# Patient Record
Sex: MALE | Race: WHITE | NOT HISPANIC OR LATINO | URBAN - METROPOLITAN AREA
[De-identification: names, ages, dates, MRNs, and addresses within clinical notes are randomized per-mention and may not be internally consistent; named-entity substitution may affect disease eponyms.]

---

## 2020-07-25 ENCOUNTER — TELEPHONE ENCOUNTER (OUTPATIENT)
Dept: URBAN - METROPOLITAN AREA CLINIC 13 | Facility: CLINIC | Age: 75
End: 2020-07-25

## 2020-07-25 RX ORDER — POLYETHYLENE GLYCOL 3350, SODIUM CHLORIDE, SODIUM BICARBONATE AND POTASSIUM CHLORIDE WITH LEMON FLAVOR 420; 11.2; 5.72; 1.48 G/4L; G/4L; G/4L; G/4L
TAKE 1/2 GALLON AT 5:00 PM DAY BEFORE PROCEDURE, TAKE SECOND 1/2 OF GALLON 6 HRS PRIOR TO PROCEDURE POWDER, FOR SOLUTION ORAL
Qty: 1 | Refills: 0 | OUTPATIENT
Start: 2018-04-30 | End: 2018-05-04

## 2020-07-26 ENCOUNTER — TELEPHONE ENCOUNTER (OUTPATIENT)
Dept: URBAN - METROPOLITAN AREA CLINIC 13 | Facility: CLINIC | Age: 75
End: 2020-07-26

## 2020-07-26 RX ORDER — OXYBUTYNIN 5 MG/1
TAKE 1 TABLET TWICE DAILY TABLET, FILM COATED, EXTENDED RELEASE ORAL
Refills: 0 | Status: ACTIVE | COMMUNITY

## 2020-07-26 RX ORDER — GLIMEPIRIDE 4 MG/1
TABLET ORAL
Qty: 90 | Refills: 0 | Status: ACTIVE | COMMUNITY
Start: 2011-01-27

## 2020-07-26 RX ORDER — CHLORHEXIDINE GLUCONATE 4 %
TAKE 1 TABLET DAILY AS DIRECTED LIQUID (ML) TOPICAL
Refills: 0 | Status: ACTIVE | COMMUNITY

## 2020-07-26 RX ORDER — UBIDECARENONE/VIT E ACET 100MG-5
2 TABLETS EVERY DAY (TOTAL OF 200 MG) CAPSULE ORAL
Refills: 0 | Status: ACTIVE | COMMUNITY

## 2020-07-26 RX ORDER — LIRAGLUTIDE 6 MG/ML
INJECT 1.8 MG SUBCUTANEOUSLY EVERY DAY INJECTION SUBCUTANEOUS
Refills: 0 | Status: ACTIVE | COMMUNITY

## 2020-07-26 RX ORDER — METOPROLOL TARTRATE 25 MG/1
TAKE 2 EVERY DAY TABLET, FILM COATED ORAL
Refills: 0 | Status: ACTIVE | COMMUNITY

## 2020-07-26 RX ORDER — GLIMEPIRIDE 4 MG/1
1/2 TWO TIMES DAILY (ALSO TAKEN WITH 1/2 OF 2 MG TABLET TO EQUAL TOTAL DOSE OF 3 MG TAKEN TWICE DAILY) TABLET ORAL
Refills: 0 | Status: ACTIVE | COMMUNITY

## 2020-07-26 RX ORDER — GLIMEPIRIDE 2 MG/1
TAKE 1/2 TABLET TWICE DAILY TABLET ORAL
Refills: 0 | Status: ACTIVE | COMMUNITY

## 2020-07-26 RX ORDER — TAMSULOSIN HYDROCHLORIDE 0.4 MG/1
1 TAKEN PER DAY CAPSULE ORAL
Refills: 0 | Status: ACTIVE | COMMUNITY

## 2020-07-26 RX ORDER — AMOXICILLIN 875 MG/1
TABLET, FILM COATED ORAL
Qty: 20 | Refills: 0 | Status: ACTIVE | COMMUNITY
Start: 2011-04-07

## 2020-07-26 RX ORDER — EMPAGLIFLOZIN 25 MG/1
TAKE 1 TABLET BY MOUTH ONCE DAILY TABLET, FILM COATED ORAL
Refills: 0 | Status: ACTIVE | COMMUNITY
Start: 2019-02-03

## 2020-07-26 RX ORDER — OMEPRAZOLE 10 MG/1
TAKE 1 CAPSULE AS NEEDED DAILY CAPSULE, DELAYED RELEASE ORAL
Refills: 0 | Status: ACTIVE | COMMUNITY
Start: 2010-08-20

## 2020-07-26 RX ORDER — ALPHA LIPOIC ACID 50 MG
TAKE 1 CAPSULE EVERY DAY CAPSULE ORAL
Refills: 0 | Status: ACTIVE | COMMUNITY

## 2024-03-26 ENCOUNTER — OV NP (OUTPATIENT)
Dept: URBAN - METROPOLITAN AREA CLINIC 113 | Facility: CLINIC | Age: 79
End: 2024-03-26
Payer: MEDICARE

## 2024-03-26 VITALS
TEMPERATURE: 97.6 F | WEIGHT: 225 LBS | SYSTOLIC BLOOD PRESSURE: 159 MMHG | HEART RATE: 60 BPM | BODY MASS INDEX: 33.33 KG/M2 | HEIGHT: 69 IN | DIASTOLIC BLOOD PRESSURE: 59 MMHG

## 2024-03-26 DIAGNOSIS — R14.0 ABDOMINAL BLOATING: ICD-10-CM

## 2024-03-26 DIAGNOSIS — K58.0 IRRITABLE BOWEL SYNDROME WITH DIARRHEA: ICD-10-CM

## 2024-03-26 DIAGNOSIS — K63.8219 SMALL INTESTINAL BACTERIAL OVERGROWTH (SIBO): ICD-10-CM

## 2024-03-26 DIAGNOSIS — R14.2 BELCHING: ICD-10-CM

## 2024-03-26 PROBLEM — 197125005: Status: ACTIVE | Noted: 2024-03-26

## 2024-03-26 PROCEDURE — 99204 OFFICE O/P NEW MOD 45 MIN: CPT | Performed by: NURSE PRACTITIONER

## 2024-03-26 RX ORDER — LIRAGLUTIDE 6 MG/ML
INJECT 1.8 MG SUBCUTANEOUSLY EVERY DAY INJECTION SUBCUTANEOUS
Refills: 0 | Status: ON HOLD | COMMUNITY

## 2024-03-26 RX ORDER — GLIMEPIRIDE 2 MG/1
TAKE 1/2 TABLET TWICE DAILY TABLET ORAL
Refills: 0 | Status: ON HOLD | COMMUNITY

## 2024-03-26 RX ORDER — EMPAGLIFLOZIN 25 MG/1
TAKE 1 TABLET BY MOUTH ONCE DAILY TABLET, FILM COATED ORAL
Refills: 0 | Status: ON HOLD | COMMUNITY
Start: 2019-02-03

## 2024-03-26 RX ORDER — GLIMEPIRIDE 4 MG/1
1/2 TWO TIMES DAILY (ALSO TAKEN WITH 1/2 OF 2 MG TABLET TO EQUAL TOTAL DOSE OF 3 MG TAKEN TWICE DAILY) TABLET ORAL
Refills: 0 | Status: ACTIVE | COMMUNITY

## 2024-03-26 RX ORDER — AMOXICILLIN 875 MG/1
TABLET, FILM COATED ORAL
Qty: 20 | Refills: 0 | Status: ON HOLD | COMMUNITY
Start: 2011-04-07

## 2024-03-26 RX ORDER — OXYBUTYNIN 5 MG/1
TAKE 1 TABLET TWICE DAILY TABLET, FILM COATED, EXTENDED RELEASE ORAL
Refills: 0 | Status: ON HOLD | COMMUNITY

## 2024-03-26 RX ORDER — ALPHA LIPOIC ACID 50 MG
TAKE 1 CAPSULE EVERY DAY CAPSULE ORAL
Refills: 0 | Status: ON HOLD | COMMUNITY

## 2024-03-26 RX ORDER — TAMSULOSIN HYDROCHLORIDE 0.4 MG/1
1 TAKEN PER DAY CAPSULE ORAL
Refills: 0 | Status: ACTIVE | COMMUNITY

## 2024-03-26 RX ORDER — METOPROLOL TARTRATE 25 MG/1
TAKE 2 EVERY DAY TABLET, FILM COATED ORAL
Refills: 0 | Status: ON HOLD | COMMUNITY

## 2024-03-26 RX ORDER — GLIMEPIRIDE 4 MG/1
TABLET ORAL
Qty: 90 | Refills: 0 | Status: ON HOLD | COMMUNITY
Start: 2011-01-27

## 2024-03-26 RX ORDER — SEMAGLUTIDE 0.68 MG/ML
AS DIRECTED INJECTION, SOLUTION SUBCUTANEOUS
Status: ACTIVE | COMMUNITY

## 2024-03-26 RX ORDER — OMEPRAZOLE 10 MG/1
TAKE 1 CAPSULE AS NEEDED DAILY CAPSULE, DELAYED RELEASE ORAL
Refills: 0 | Status: ON HOLD | COMMUNITY
Start: 2010-08-20

## 2024-03-26 RX ORDER — METOPROLOL SUCCINATE 50 MG/1
1 TABLET TABLET, EXTENDED RELEASE ORAL ONCE A DAY
Status: ACTIVE | COMMUNITY

## 2024-03-26 RX ORDER — ATORVASTATIN CALCIUM 20 MG/1
1 TABLET TABLET, FILM COATED ORAL ONCE A DAY
Status: ACTIVE | COMMUNITY

## 2024-03-26 RX ORDER — TAMSULOSIN HYDROCHLORIDE 0.4 MG/1
1 CAPSULE CAPSULE ORAL ONCE A DAY
Status: ON HOLD | COMMUNITY

## 2024-03-26 RX ORDER — RIFAXIMIN 550 MG/1
1 TABLET TABLET ORAL THREE TIMES A DAY
Qty: 42 TABLET | Refills: 0 | OUTPATIENT
Start: 2024-03-26 | End: 2024-04-09

## 2024-03-26 RX ORDER — METFORMIN HYDROCHLORIDE 1000 MG/1
1 TABLET WITH A MEAL TABLET, FILM COATED ORAL ONCE A DAY
Status: ACTIVE | COMMUNITY

## 2024-03-26 RX ORDER — FAMOTIDINE 40 MG/1
1 TABLET AT BEDTIME TABLET, FILM COATED ORAL ONCE A DAY
Status: ACTIVE | COMMUNITY

## 2024-03-26 RX ORDER — FLUCONAZOLE 150 MG/1
1 TABLET TABLET ORAL
Status: ACTIVE | COMMUNITY

## 2024-03-26 RX ORDER — CHLORHEXIDINE GLUCONATE 4 %
TAKE 1 TABLET DAILY AS DIRECTED LIQUID (ML) TOPICAL
Refills: 0 | Status: ACTIVE | COMMUNITY

## 2024-03-26 RX ORDER — UBIDECARENONE/VIT E ACET 100MG-5
2 TABLETS EVERY DAY (TOTAL OF 200 MG) CAPSULE ORAL
Refills: 0 | Status: ACTIVE | COMMUNITY

## 2024-03-26 NOTE — HPI-TODAY'S VISIT:
78-year-old male presenting for a change in bowel habit. His last colonoscopy in May 2018 was notable for a good bowel preparation, normal rectal exam, diverticulosis in the sigmoid and descending colon, otherwise normal examination.  No specimens were collected.  A repeat colonoscopy was recommended in 10 years. He presents today with complaints of postprandial belching and occasional bouts of loose stools.  He can have as many as 1-2 loose stools per day to 3-4 times per day.  There is no blood per rectum.  He has not identified any triggers.  Imodium is helpful.  He feels that the diarrhea comes out of nowhere, though it can be related to belching.  The symptoms seem to be progressing.  He was started on Ozempic in January, but has not had any worsening of symptoms.  He has been started on pantoprazole and famotidine with some improvement but not complete resolved.  He continues with gas complaints.  He has occasional heartburn but otherwise no nausea, vomiting or difficulty swallowing.  His weight is stable.  Ironically, he has had 2 courses of azithromycin for other issues with good effects on his GI symptoms.

## 2024-05-08 ENCOUNTER — DASHBOARD ENCOUNTERS (OUTPATIENT)
Age: 79
End: 2024-05-08

## 2024-05-08 ENCOUNTER — OFFICE VISIT (OUTPATIENT)
Dept: URBAN - METROPOLITAN AREA CLINIC 113 | Facility: CLINIC | Age: 79
End: 2024-05-08
Payer: MEDICARE

## 2024-05-08 VITALS
DIASTOLIC BLOOD PRESSURE: 70 MMHG | SYSTOLIC BLOOD PRESSURE: 157 MMHG | HEART RATE: 66 BPM | HEIGHT: 69 IN | TEMPERATURE: 98.6 F | BODY MASS INDEX: 32.82 KG/M2 | RESPIRATION RATE: 20 BRPM | WEIGHT: 221.6 LBS

## 2024-05-08 DIAGNOSIS — K58.0 IRRITABLE BOWEL SYNDROME WITH DIARRHEA: ICD-10-CM

## 2024-05-08 PROCEDURE — 99213 OFFICE O/P EST LOW 20 MIN: CPT | Performed by: NURSE PRACTITIONER

## 2024-05-08 RX ORDER — GLIMEPIRIDE 2 MG/1
TAKE 1/2 TABLET TWICE DAILY TABLET ORAL
Refills: 0 | Status: ON HOLD | COMMUNITY

## 2024-05-08 RX ORDER — METFORMIN HYDROCHLORIDE 1000 MG/1
1 TABLET WITH A MEAL TABLET, FILM COATED ORAL ONCE A DAY
Status: ACTIVE | COMMUNITY

## 2024-05-08 RX ORDER — EMPAGLIFLOZIN 25 MG/1
TAKE 1 TABLET BY MOUTH ONCE DAILY TABLET, FILM COATED ORAL
Refills: 0 | Status: ON HOLD | COMMUNITY
Start: 2019-02-03

## 2024-05-08 RX ORDER — UBIDECARENONE/VIT E ACET 100MG-5
2 TABLETS EVERY DAY (TOTAL OF 200 MG) CAPSULE ORAL
Refills: 0 | Status: ACTIVE | COMMUNITY

## 2024-05-08 RX ORDER — TAMSULOSIN HYDROCHLORIDE 0.4 MG/1
1 TAKEN PER DAY CAPSULE ORAL
Refills: 0 | Status: ACTIVE | COMMUNITY

## 2024-05-08 RX ORDER — ALPHA LIPOIC ACID 50 MG
TAKE 1 CAPSULE EVERY DAY CAPSULE ORAL
Refills: 0 | Status: ON HOLD | COMMUNITY

## 2024-05-08 RX ORDER — METOPROLOL SUCCINATE 50 MG/1
1 TABLET TABLET, EXTENDED RELEASE ORAL ONCE A DAY
Status: ACTIVE | COMMUNITY

## 2024-05-08 RX ORDER — ATORVASTATIN CALCIUM 20 MG/1
1 TABLET TABLET, FILM COATED ORAL ONCE A DAY
Status: ACTIVE | COMMUNITY

## 2024-05-08 RX ORDER — OMEPRAZOLE 10 MG/1
TAKE 1 CAPSULE AS NEEDED DAILY CAPSULE, DELAYED RELEASE ORAL
Refills: 0 | Status: ON HOLD | COMMUNITY
Start: 2010-08-20

## 2024-05-08 RX ORDER — METOPROLOL TARTRATE 25 MG/1
TAKE 2 EVERY DAY TABLET, FILM COATED ORAL
Refills: 0 | Status: ON HOLD | COMMUNITY

## 2024-05-08 RX ORDER — AMOXICILLIN 875 MG/1
TABLET, FILM COATED ORAL
Qty: 20 | Refills: 0 | Status: ON HOLD | COMMUNITY
Start: 2011-04-07

## 2024-05-08 RX ORDER — LIRAGLUTIDE 6 MG/ML
INJECT 1.8 MG SUBCUTANEOUSLY EVERY DAY INJECTION SUBCUTANEOUS
Refills: 0 | Status: ON HOLD | COMMUNITY

## 2024-05-08 RX ORDER — GLIMEPIRIDE 4 MG/1
1/2 TWO TIMES DAILY (ALSO TAKEN WITH 1/2 OF 2 MG TABLET TO EQUAL TOTAL DOSE OF 3 MG TAKEN TWICE DAILY) TABLET ORAL
Refills: 0 | Status: ACTIVE | COMMUNITY

## 2024-05-08 RX ORDER — FAMOTIDINE 40 MG/1
1 TABLET AT BEDTIME TABLET, FILM COATED ORAL ONCE A DAY
Status: ACTIVE | COMMUNITY

## 2024-05-08 RX ORDER — FLUCONAZOLE 150 MG/1
1 TABLET TABLET ORAL
Status: ACTIVE | COMMUNITY

## 2024-05-08 RX ORDER — SEMAGLUTIDE 0.68 MG/ML
AS DIRECTED INJECTION, SOLUTION SUBCUTANEOUS
Status: ACTIVE | COMMUNITY

## 2024-05-08 RX ORDER — GLIMEPIRIDE 4 MG/1
TABLET ORAL
Qty: 90 | Refills: 0 | Status: ON HOLD | COMMUNITY
Start: 2011-01-27

## 2024-05-08 RX ORDER — OXYBUTYNIN 5 MG/1
TAKE 1 TABLET TWICE DAILY TABLET, FILM COATED, EXTENDED RELEASE ORAL
Refills: 0 | Status: ON HOLD | COMMUNITY

## 2024-05-08 RX ORDER — TAMSULOSIN HYDROCHLORIDE 0.4 MG/1
1 CAPSULE CAPSULE ORAL ONCE A DAY
Status: ON HOLD | COMMUNITY

## 2024-05-08 RX ORDER — CHLORHEXIDINE GLUCONATE 4 %
TAKE 1 TABLET DAILY AS DIRECTED LIQUID (ML) TOPICAL
Refills: 0 | Status: ACTIVE | COMMUNITY

## 2024-06-10 ENCOUNTER — TELEPHONE ENCOUNTER (OUTPATIENT)
Dept: URBAN - METROPOLITAN AREA CLINIC 113 | Facility: CLINIC | Age: 79
End: 2024-06-10

## 2024-06-10 RX ORDER — RIFAXIMIN 550 MG/1
1 TABLET TABLET ORAL THREE TIMES A DAY
Qty: 42 TABLET | Refills: 0 | OUTPATIENT
Start: 2024-06-10 | End: 2024-06-24

## 2024-06-11 ENCOUNTER — TELEPHONE ENCOUNTER (OUTPATIENT)
Dept: URBAN - METROPOLITAN AREA CLINIC 113 | Facility: CLINIC | Age: 79
End: 2024-06-11

## 2024-06-12 ENCOUNTER — TELEPHONE ENCOUNTER (OUTPATIENT)
Dept: URBAN - METROPOLITAN AREA CLINIC 113 | Facility: CLINIC | Age: 79
End: 2024-06-12

## 2024-11-19 ENCOUNTER — OFFICE VISIT (OUTPATIENT)
Dept: URBAN - METROPOLITAN AREA CLINIC 113 | Facility: CLINIC | Age: 79
End: 2024-11-19
Payer: MEDICARE

## 2024-11-19 VITALS
SYSTOLIC BLOOD PRESSURE: 169 MMHG | RESPIRATION RATE: 14 BRPM | DIASTOLIC BLOOD PRESSURE: 81 MMHG | WEIGHT: 223.4 LBS | HEIGHT: 69 IN | HEART RATE: 75 BPM | BODY MASS INDEX: 33.09 KG/M2 | TEMPERATURE: 98.1 F

## 2024-11-19 DIAGNOSIS — K58.0 IRRITABLE BOWEL SYNDROME WITH DIARRHEA: ICD-10-CM

## 2024-11-19 DIAGNOSIS — R14.0 ABDOMINAL BLOATING: ICD-10-CM

## 2024-11-19 DIAGNOSIS — R14.2 BELCHING: ICD-10-CM

## 2024-11-19 PROCEDURE — 99213 OFFICE O/P EST LOW 20 MIN: CPT | Performed by: NURSE PRACTITIONER

## 2024-11-19 RX ORDER — VIBEGRON 75 MG/1
1 TABLET TABLET, FILM COATED ORAL ONCE A DAY
Status: ACTIVE | COMMUNITY

## 2024-11-19 RX ORDER — ALPHA LIPOIC ACID 50 MG
TAKE 1 CAPSULE EVERY DAY CAPSULE ORAL
Refills: 0 | Status: ON HOLD | COMMUNITY

## 2024-11-19 RX ORDER — CHLORHEXIDINE GLUCONATE 4 %
TAKE 1 TABLET DAILY AS DIRECTED LIQUID (ML) TOPICAL
Refills: 0 | Status: ACTIVE | COMMUNITY

## 2024-11-19 RX ORDER — LIRAGLUTIDE 6 MG/ML
INJECT 1.8 MG SUBCUTANEOUSLY EVERY DAY INJECTION SUBCUTANEOUS
Refills: 0 | Status: ON HOLD | COMMUNITY

## 2024-11-19 RX ORDER — UBIDECARENONE/VIT E ACET 100MG-5
2 TABLETS EVERY DAY (TOTAL OF 200 MG) CAPSULE ORAL
Refills: 0 | Status: ACTIVE | COMMUNITY

## 2024-11-19 RX ORDER — FAMOTIDINE 40 MG/1
1 TABLET AT BEDTIME TABLET, FILM COATED ORAL ONCE A DAY
Status: ACTIVE | COMMUNITY

## 2024-11-19 RX ORDER — METOPROLOL SUCCINATE 50 MG/1
1 TABLET TABLET, EXTENDED RELEASE ORAL ONCE A DAY
Status: ACTIVE | COMMUNITY

## 2024-11-19 RX ORDER — GLIMEPIRIDE 4 MG/1
TABLET ORAL
Qty: 90 | Refills: 0 | Status: ON HOLD | COMMUNITY
Start: 2011-01-27

## 2024-11-19 RX ORDER — PANTOPRAZOLE SODIUM 40 MG/1
1 TABLET 1/2 TO 1 HOUR BEFORE MORNING MEAL TABLET, DELAYED RELEASE ORAL ONCE A DAY
Status: ACTIVE | COMMUNITY

## 2024-11-19 RX ORDER — TAMSULOSIN HYDROCHLORIDE 0.4 MG/1
1 CAPSULE CAPSULE ORAL ONCE A DAY
Status: ON HOLD | COMMUNITY

## 2024-11-19 RX ORDER — OMEPRAZOLE 10 MG/1
TAKE 1 CAPSULE AS NEEDED DAILY CAPSULE, DELAYED RELEASE ORAL
Refills: 0 | Status: ON HOLD | COMMUNITY
Start: 2010-08-20

## 2024-11-19 RX ORDER — GLIMEPIRIDE 4 MG/1
1/2 TWO TIMES DAILY (ALSO TAKEN WITH 1/2 OF 2 MG TABLET TO EQUAL TOTAL DOSE OF 3 MG TAKEN TWICE DAILY) TABLET ORAL
Refills: 0 | Status: ACTIVE | COMMUNITY

## 2024-11-19 RX ORDER — TAMSULOSIN HYDROCHLORIDE 0.4 MG/1
1 TAKEN PER DAY CAPSULE ORAL
Refills: 0 | Status: ACTIVE | COMMUNITY

## 2024-11-19 RX ORDER — VARDENAFIL HYDROCHLORIDE 20 MG/1
1 TABLET 60 MINUTES BEFORE SEXUAL ACTIVITY AS NEEDED TABLET, FILM COATED ORAL ONCE A DAY
Status: ACTIVE | COMMUNITY

## 2024-11-19 RX ORDER — FLUCONAZOLE 150 MG/1
1 TABLET TABLET ORAL
Status: ACTIVE | COMMUNITY

## 2024-11-19 RX ORDER — METOPROLOL TARTRATE 25 MG/1
TAKE 2 EVERY DAY TABLET, FILM COATED ORAL
Refills: 0 | Status: ON HOLD | COMMUNITY

## 2024-11-19 RX ORDER — ATORVASTATIN CALCIUM 20 MG/1
1 TABLET TABLET, FILM COATED ORAL ONCE A DAY
Status: ON HOLD | COMMUNITY

## 2024-11-19 RX ORDER — SEMAGLUTIDE 0.68 MG/ML
AS DIRECTED INJECTION, SOLUTION SUBCUTANEOUS
Status: ACTIVE | COMMUNITY

## 2024-11-19 RX ORDER — AMOXICILLIN 875 MG/1
TABLET, FILM COATED ORAL
Qty: 20 | Refills: 0 | Status: ON HOLD | COMMUNITY
Start: 2011-04-07

## 2024-11-19 RX ORDER — ROSUVASTATIN CALCIUM 40 MG/1
1 TABLET TABLET, COATED ORAL ONCE A DAY
Status: ACTIVE | COMMUNITY

## 2024-11-19 RX ORDER — METFORMIN HYDROCHLORIDE 1000 MG/1
1 TABLET WITH A MEAL TABLET, FILM COATED ORAL ONCE A DAY
Status: ACTIVE | COMMUNITY

## 2024-11-19 RX ORDER — EMPAGLIFLOZIN 25 MG/1
TAKE 1 TABLET BY MOUTH ONCE DAILY TABLET, FILM COATED ORAL
Refills: 0 | Status: ACTIVE | COMMUNITY
Start: 2019-02-03

## 2024-11-19 RX ORDER — OXYBUTYNIN 5 MG/1
TAKE 1 TABLET TWICE DAILY TABLET, FILM COATED, EXTENDED RELEASE ORAL
Refills: 0 | Status: ON HOLD | COMMUNITY

## 2024-11-19 RX ORDER — VALSARTAN 160 MG/1
1 TABLET TABLET, FILM COATED ORAL ONCE A DAY
Status: ACTIVE | COMMUNITY

## 2024-11-19 RX ORDER — GLIMEPIRIDE 2 MG/1
TAKE 1/2 TABLET TWICE DAILY TABLET ORAL
Refills: 0 | Status: ON HOLD | COMMUNITY

## 2024-11-19 RX ORDER — RIFAXIMIN 550 MG/1
1 TABLET TABLET ORAL TWICE A DAY
Status: ACTIVE | COMMUNITY

## 2024-11-19 NOTE — HPI-TODAY'S VISIT:
79-year-old gentleman with a history of diarrhea predominant bowel habits, gas and belching likely on the basis of a functional bowel disorder, improved with a course of Xifaxan, presenting for follow-up.  He seems to be doing okay. He repeated the course of Xifaxan, and has been doing well. He has alternating bowel habits, though has been having regular bowel movements for the last few weeks. Abdominal pain, gas and bloatingare resolved. He tells me that he was in [place] recently, suffered a fall. He was hospitalized for about 8 hours before being released. Concussion protocol and testing for spinal meningitis were negative. His PCP thinks he had COVID. He is much better now. There is no trouble with swallowing, heartburn, abdominal pain, nausea or vomiting.

## 2025-05-20 ENCOUNTER — OFFICE VISIT (OUTPATIENT)
Dept: URBAN - METROPOLITAN AREA CLINIC 113 | Facility: CLINIC | Age: 80
End: 2025-05-20

## 2025-05-21 ENCOUNTER — OFFICE VISIT (OUTPATIENT)
Dept: URBAN - METROPOLITAN AREA CLINIC 113 | Facility: CLINIC | Age: 80
End: 2025-05-21
Payer: MEDICARE

## 2025-05-21 DIAGNOSIS — R14.0 ABDOMINAL BLOATING: ICD-10-CM

## 2025-05-21 DIAGNOSIS — K58.0 IRRITABLE BOWEL SYNDROME WITH DIARRHEA: ICD-10-CM

## 2025-05-21 DIAGNOSIS — K63.8219 SMALL INTESTINAL BACTERIAL OVERGROWTH (SIBO): ICD-10-CM

## 2025-05-21 DIAGNOSIS — R14.2 BELCHING: ICD-10-CM

## 2025-05-21 PROCEDURE — 99214 OFFICE O/P EST MOD 30 MIN: CPT | Performed by: NURSE PRACTITIONER

## 2025-05-21 RX ORDER — AMOXICILLIN 875 MG/1
TABLET, FILM COATED ORAL
Qty: 20 | Refills: 0 | Status: ON HOLD | COMMUNITY
Start: 2011-04-07

## 2025-05-21 RX ORDER — SEMAGLUTIDE 0.68 MG/ML
AS DIRECTED INJECTION, SOLUTION SUBCUTANEOUS
Status: ACTIVE | COMMUNITY

## 2025-05-21 RX ORDER — GLIMEPIRIDE 2 MG/1
TAKE 1/2 TABLET TWICE DAILY TABLET ORAL
Refills: 0 | Status: ON HOLD | COMMUNITY

## 2025-05-21 RX ORDER — METOPROLOL SUCCINATE 50 MG/1
1 TABLET TABLET, EXTENDED RELEASE ORAL ONCE A DAY
Status: ACTIVE | COMMUNITY

## 2025-05-21 RX ORDER — METOPROLOL TARTRATE 25 MG/1
TAKE 2 EVERY DAY TABLET, FILM COATED ORAL
Refills: 0 | Status: ON HOLD | COMMUNITY

## 2025-05-21 RX ORDER — RIFAXIMIN 550 MG/1
1 TABLET TABLET ORAL TWICE A DAY
Status: ON HOLD | COMMUNITY

## 2025-05-21 RX ORDER — VALSARTAN 160 MG/1
1 TABLET TABLET, FILM COATED ORAL ONCE A DAY
Status: ACTIVE | COMMUNITY

## 2025-05-21 RX ORDER — ATORVASTATIN CALCIUM 20 MG/1
1 TABLET TABLET, FILM COATED ORAL ONCE A DAY
Status: ON HOLD | COMMUNITY

## 2025-05-21 RX ORDER — ROSUVASTATIN CALCIUM 40 MG/1
1 TABLET TABLET, COATED ORAL ONCE A DAY
Status: ACTIVE | COMMUNITY

## 2025-05-21 RX ORDER — UBIDECARENONE/VIT E ACET 100MG-5
2 TABLETS EVERY DAY (TOTAL OF 200 MG) CAPSULE ORAL
Refills: 0 | Status: ACTIVE | COMMUNITY

## 2025-05-21 RX ORDER — ALPHA LIPOIC ACID 50 MG
TAKE 1 CAPSULE EVERY DAY CAPSULE ORAL
Refills: 0 | Status: ON HOLD | COMMUNITY

## 2025-05-21 RX ORDER — LIRAGLUTIDE 6 MG/ML
INJECT 1.8 MG SUBCUTANEOUSLY EVERY DAY INJECTION SUBCUTANEOUS
Refills: 0 | Status: ON HOLD | COMMUNITY

## 2025-05-21 RX ORDER — METFORMIN HYDROCHLORIDE 1000 MG/1
1 TABLET WITH A MEAL TABLET, FILM COATED ORAL ONCE A DAY
Status: ACTIVE | COMMUNITY

## 2025-05-21 RX ORDER — GLIMEPIRIDE 4 MG/1
1/2 TWO TIMES DAILY (ALSO TAKEN WITH 1/2 OF 2 MG TABLET TO EQUAL TOTAL DOSE OF 3 MG TAKEN TWICE DAILY) TABLET ORAL
Refills: 0 | Status: ACTIVE | COMMUNITY

## 2025-05-21 RX ORDER — RIFAXIMIN 550 MG/1
1 TABLET TABLET ORAL THREE TIMES A DAY
Qty: 42 TABLET | Refills: 0 | OUTPATIENT
Start: 2025-05-21 | End: 2025-06-04

## 2025-05-21 RX ORDER — FLUCONAZOLE 150 MG/1
1 TABLET TABLET ORAL
Status: ACTIVE | COMMUNITY

## 2025-05-21 RX ORDER — FAMOTIDINE 40 MG/1
1 TABLET AT BEDTIME TABLET, FILM COATED ORAL ONCE A DAY
Status: ACTIVE | COMMUNITY

## 2025-05-21 RX ORDER — OXYBUTYNIN 5 MG/1
TAKE 1 TABLET TWICE DAILY TABLET, FILM COATED, EXTENDED RELEASE ORAL
Refills: 0 | Status: ON HOLD | COMMUNITY

## 2025-05-21 RX ORDER — CHLORHEXIDINE GLUCONATE 4 %
TAKE 1 TABLET DAILY AS DIRECTED LIQUID (ML) TOPICAL
Refills: 0 | Status: ACTIVE | COMMUNITY

## 2025-05-21 RX ORDER — VIBEGRON 75 MG/1
1 TABLET TABLET, FILM COATED ORAL ONCE A DAY
Status: ACTIVE | COMMUNITY

## 2025-05-21 RX ORDER — PANTOPRAZOLE SODIUM 40 MG/1
1 TABLET 1/2 TO 1 HOUR BEFORE MORNING MEAL TABLET, DELAYED RELEASE ORAL ONCE A DAY
Status: ACTIVE | COMMUNITY

## 2025-05-21 RX ORDER — TAMSULOSIN HYDROCHLORIDE 0.4 MG/1
1 CAPSULE CAPSULE ORAL ONCE A DAY
Status: ON HOLD | COMMUNITY

## 2025-05-21 RX ORDER — EMPAGLIFLOZIN 25 MG/1
TAKE 1 TABLET BY MOUTH ONCE DAILY TABLET, FILM COATED ORAL
Refills: 0 | Status: ACTIVE | COMMUNITY
Start: 2019-02-03

## 2025-05-21 RX ORDER — GLIMEPIRIDE 4 MG/1
TABLET ORAL
Qty: 90 | Refills: 0 | Status: ON HOLD | COMMUNITY
Start: 2011-01-27

## 2025-05-21 RX ORDER — TAMSULOSIN HYDROCHLORIDE 0.4 MG/1
1 TAKEN PER DAY CAPSULE ORAL
Refills: 0 | Status: ACTIVE | COMMUNITY

## 2025-05-21 RX ORDER — VARDENAFIL HYDROCHLORIDE 20 MG/1
1 TABLET 60 MINUTES BEFORE SEXUAL ACTIVITY AS NEEDED TABLET, FILM COATED ORAL ONCE A DAY
Status: ACTIVE | COMMUNITY

## 2025-05-21 RX ORDER — OMEPRAZOLE 10 MG/1
TAKE 1 CAPSULE AS NEEDED DAILY CAPSULE, DELAYED RELEASE ORAL
Refills: 0 | Status: ON HOLD | COMMUNITY
Start: 2010-08-20

## 2025-05-21 NOTE — HPI-TODAY'S VISIT:
78 yo male with a history of IBS with diarrhea and bloaitng persening for follow up. He previously improved with a course of Xifaxan.  He is doing well. There is no abdominal pain currently. He has intermittent episodes of loose stools, responsive to Lomotil as needed. No blood per rectum. He does have some bloating at times. He would like to try a repeat course of Xifaxan, stating he has a bottle at home and [facility]

## 2025-08-21 ENCOUNTER — OFFICE VISIT (OUTPATIENT)
Dept: URBAN - METROPOLITAN AREA CLINIC 113 | Facility: CLINIC | Age: 80
End: 2025-08-21
Payer: MEDICARE

## 2025-08-21 DIAGNOSIS — R14.0 ABDOMINAL BLOATING: ICD-10-CM

## 2025-08-21 DIAGNOSIS — K58.0 IRRITABLE BOWEL SYNDROME WITH DIARRHEA: ICD-10-CM

## 2025-08-21 PROCEDURE — 99213 OFFICE O/P EST LOW 20 MIN: CPT | Performed by: NURSE PRACTITIONER

## 2025-08-21 RX ORDER — OMEPRAZOLE 10 MG/1
TAKE 1 CAPSULE AS NEEDED DAILY CAPSULE, DELAYED RELEASE ORAL
Refills: 0 | Status: ON HOLD | COMMUNITY
Start: 2010-08-20

## 2025-08-21 RX ORDER — ROSUVASTATIN CALCIUM 40 MG/1
1 TABLET TABLET, COATED ORAL ONCE A DAY
Status: ACTIVE | COMMUNITY

## 2025-08-21 RX ORDER — AMOXICILLIN 875 MG/1
TABLET, FILM COATED ORAL
Qty: 20 | Refills: 0 | Status: ON HOLD | COMMUNITY
Start: 2011-04-07

## 2025-08-21 RX ORDER — VALSARTAN 160 MG/1
1 TABLET TABLET, FILM COATED ORAL ONCE A DAY
Status: ACTIVE | COMMUNITY

## 2025-08-21 RX ORDER — GLIMEPIRIDE 2 MG/1
TAKE 1/2 TABLET TWICE DAILY TABLET ORAL
Refills: 0 | Status: ON HOLD | COMMUNITY

## 2025-08-21 RX ORDER — PANTOPRAZOLE SODIUM 40 MG/1
1 TABLET 1/2 TO 1 HOUR BEFORE MORNING MEAL TABLET, DELAYED RELEASE ORAL ONCE A DAY
Status: ACTIVE | COMMUNITY

## 2025-08-21 RX ORDER — METFORMIN HYDROCHLORIDE 1000 MG/1
1 TABLET WITH A MEAL TABLET, FILM COATED ORAL ONCE A DAY
Status: ACTIVE | COMMUNITY

## 2025-08-21 RX ORDER — METOPROLOL TARTRATE 25 MG/1
TAKE 2 EVERY DAY TABLET, FILM COATED ORAL
Refills: 0 | Status: ON HOLD | COMMUNITY

## 2025-08-21 RX ORDER — TAMSULOSIN HYDROCHLORIDE 0.4 MG/1
1 CAPSULE CAPSULE ORAL ONCE A DAY
Status: ON HOLD | COMMUNITY

## 2025-08-21 RX ORDER — CHLORHEXIDINE GLUCONATE 4 %
TAKE 1 TABLET DAILY AS DIRECTED LIQUID (ML) TOPICAL
Refills: 0 | Status: ACTIVE | COMMUNITY

## 2025-08-21 RX ORDER — SEMAGLUTIDE 1.34 MG/ML
AS DIRECTED INJECTION, SOLUTION SUBCUTANEOUS
Status: ACTIVE | COMMUNITY

## 2025-08-21 RX ORDER — GLIMEPIRIDE 4 MG/1
TABLET ORAL
Qty: 90 | Refills: 0 | Status: ON HOLD | COMMUNITY
Start: 2011-01-27

## 2025-08-21 RX ORDER — GLIMEPIRIDE 4 MG/1
1/2 TWO TIMES DAILY (ALSO TAKEN WITH 1/2 OF 2 MG TABLET TO EQUAL TOTAL DOSE OF 3 MG TAKEN TWICE DAILY) TABLET ORAL
Refills: 0 | Status: ACTIVE | COMMUNITY

## 2025-08-21 RX ORDER — VIBEGRON 75 MG/1
1 TABLET TABLET, FILM COATED ORAL ONCE A DAY
Status: ACTIVE | COMMUNITY

## 2025-08-21 RX ORDER — METOPROLOL SUCCINATE 50 MG/1
1 TABLET TABLET, EXTENDED RELEASE ORAL ONCE A DAY
Status: ACTIVE | COMMUNITY

## 2025-08-21 RX ORDER — FAMOTIDINE 40 MG/1
1 TABLET AT BEDTIME TABLET, FILM COATED ORAL ONCE A DAY
Status: ACTIVE | COMMUNITY

## 2025-08-21 RX ORDER — ALPHA LIPOIC ACID 50 MG
TAKE 1 CAPSULE EVERY DAY CAPSULE ORAL
Refills: 0 | Status: ON HOLD | COMMUNITY

## 2025-08-21 RX ORDER — EMPAGLIFLOZIN 25 MG/1
TAKE 1 TABLET BY MOUTH ONCE DAILY TABLET, FILM COATED ORAL
Refills: 0 | Status: ACTIVE | COMMUNITY
Start: 2019-02-03

## 2025-08-21 RX ORDER — FLUCONAZOLE 150 MG/1
1 TABLET TABLET ORAL
Status: ACTIVE | COMMUNITY

## 2025-08-21 RX ORDER — UBIDECARENONE/VIT E ACET 100MG-5
2 TABLETS EVERY DAY (TOTAL OF 200 MG) CAPSULE ORAL
Refills: 0 | Status: ACTIVE | COMMUNITY

## 2025-08-21 RX ORDER — ATORVASTATIN CALCIUM 20 MG/1
1 TABLET TABLET, FILM COATED ORAL ONCE A DAY
Status: ON HOLD | COMMUNITY

## 2025-08-21 RX ORDER — TAMSULOSIN HYDROCHLORIDE 0.4 MG/1
1 TAKEN PER DAY CAPSULE ORAL
Refills: 0 | Status: ACTIVE | COMMUNITY

## 2025-08-21 RX ORDER — LIRAGLUTIDE 6 MG/ML
INJECT 1.8 MG SUBCUTANEOUSLY EVERY DAY INJECTION SUBCUTANEOUS
Refills: 0 | Status: ON HOLD | COMMUNITY

## 2025-08-21 RX ORDER — OXYBUTYNIN 5 MG/1
TAKE 1 TABLET TWICE DAILY TABLET, FILM COATED, EXTENDED RELEASE ORAL
Refills: 0 | Status: ON HOLD | COMMUNITY

## 2025-08-21 RX ORDER — RIFAXIMIN 550 MG/1
1 TABLET TABLET ORAL TWICE A DAY
Status: ON HOLD | COMMUNITY

## 2025-08-21 RX ORDER — VARDENAFIL HYDROCHLORIDE 20 MG/1
1 TABLET 60 MINUTES BEFORE SEXUAL ACTIVITY AS NEEDED TABLET, FILM COATED ORAL ONCE A DAY
Status: ACTIVE | COMMUNITY